# Patient Record
Sex: MALE | Race: BLACK OR AFRICAN AMERICAN | NOT HISPANIC OR LATINO | URBAN - METROPOLITAN AREA
[De-identification: names, ages, dates, MRNs, and addresses within clinical notes are randomized per-mention and may not be internally consistent; named-entity substitution may affect disease eponyms.]

---

## 2023-08-31 ENCOUNTER — OFFICE (OUTPATIENT)
Dept: URBAN - METROPOLITAN AREA CLINIC 76 | Facility: CLINIC | Age: 73
End: 2023-08-31

## 2023-08-31 VITALS
SYSTOLIC BLOOD PRESSURE: 104 MMHG | OXYGEN SATURATION: 98 % | HEIGHT: 69 IN | WEIGHT: 227 LBS | DIASTOLIC BLOOD PRESSURE: 72 MMHG | HEART RATE: 89 BPM

## 2023-08-31 DIAGNOSIS — D64.9 ANEMIA, UNSPECIFIED: ICD-10-CM

## 2023-08-31 DIAGNOSIS — R68.81 EARLY SATIETY: ICD-10-CM

## 2023-08-31 DIAGNOSIS — R14.0 ABDOMINAL DISTENSION (GASEOUS): ICD-10-CM

## 2023-08-31 PROCEDURE — 99204 OFFICE O/P NEW MOD 45 MIN: CPT | Performed by: INTERNAL MEDICINE

## 2023-12-01 ENCOUNTER — OFFICE VISIT (OUTPATIENT)
Dept: ORTHOPEDIC SURGERY | Facility: CLINIC | Age: 73
End: 2023-12-01
Payer: OTHER GOVERNMENT

## 2023-12-01 VITALS — TEMPERATURE: 97.6 F | HEIGHT: 69 IN | WEIGHT: 229.8 LBS | BODY MASS INDEX: 34.04 KG/M2

## 2023-12-01 DIAGNOSIS — G89.29 CHRONIC LEFT SHOULDER PAIN: ICD-10-CM

## 2023-12-01 DIAGNOSIS — R52 PAIN: Primary | ICD-10-CM

## 2023-12-01 DIAGNOSIS — M25.512 CHRONIC LEFT SHOULDER PAIN: ICD-10-CM

## 2023-12-01 PROCEDURE — 99203 OFFICE O/P NEW LOW 30 MIN: CPT | Performed by: ORTHOPAEDIC SURGERY

## 2023-12-01 RX ORDER — LOSARTAN POTASSIUM 25 MG/1
1 TABLET ORAL EVERY 24 HOURS
COMMUNITY

## 2023-12-01 RX ORDER — ASPIRIN 81 MG/1
81 TABLET ORAL DAILY
COMMUNITY

## 2023-12-01 RX ORDER — LANOLIN ALCOHOL/MO/W.PET/CERES
3 CREAM (GRAM) TOPICAL NIGHTLY
COMMUNITY

## 2023-12-01 RX ORDER — FLUTICASONE PROPIONATE 50 MCG
2 SPRAY, SUSPENSION (ML) NASAL DAILY
COMMUNITY

## 2023-12-01 RX ORDER — TAMSULOSIN HYDROCHLORIDE 0.4 MG/1
2 CAPSULE ORAL DAILY
COMMUNITY

## 2023-12-01 RX ORDER — ATORVASTATIN CALCIUM 20 MG/1
20 TABLET, FILM COATED ORAL DAILY
COMMUNITY

## 2023-12-01 RX ORDER — METHYLPREDNISOLONE 4 MG/1
TABLET ORAL
Qty: 21 TABLET | Refills: 0 | Status: SHIPPED | OUTPATIENT
Start: 2023-12-01

## 2023-12-01 RX ORDER — HYDROCHLOROTHIAZIDE 25 MG/1
25 TABLET ORAL DAILY
COMMUNITY

## 2023-12-01 NOTE — PROGRESS NOTES
"Left shoulder pain  Patient: Ángel Ferguson    YOB: 1950    Medical Record Number: [id number]    Chief Complaints: Left shoulder pain    History of Present Illness:     73 y.o. male patient who presents for his left shoulder.  He tells me that he had a left rotator cuff repair in 2003.  He says that at that time his doctor told him he would \"always have problems with that shoulder\".  It took him about a year to recover from the surgery but he says he ultimately did well.  He said his shoulder was doing fine up until about 2 weeks ago.  He simply woke up in the middle of the night with severe pain in the shoulder and he was unable to raise the arm.  He says that over the past couple of weeks the pain has steadily gotten better.  He has been using \"athletic rub\" on the shoulder and he feels like that has helped.  He estimates his improvement at around 70% at this point.  He gestures diffusely around the shoulder when asked to localize his pain.  He says that he does get occasional shooting pain down the arm.  He has also been experiencing intermittent numbness and tingling.    Allergies: No Known Allergies    Home Medications:      Current Outpatient Medications:     apixaban (Eliquis) 5 MG tablet tablet, Take 1 tablet by mouth Every 12 (Twelve) Hours., Disp: , Rfl:     aspirin 81 MG EC tablet, Take 1 tablet by mouth Daily., Disp: , Rfl:     atorvastatin (LIPITOR) 20 MG tablet, Take 1 tablet by mouth Daily., Disp: , Rfl:     Carboxymethylcellulose Sodium (REFRESH TEARS OP), Apply  to eye(s) as directed by provider., Disp: , Rfl:     fluticasone (FLONASE) 50 MCG/ACT nasal spray, 2 sprays into the nostril(s) as directed by provider Daily., Disp: , Rfl:     hydroCHLOROthiazide (HYDRODIURIL) 25 MG tablet, Take 1 tablet by mouth Daily., Disp: , Rfl:     losartan (COZAAR) 25 MG tablet, Take 1 tablet by mouth Daily., Disp: , Rfl:     melatonin 3 MG tablet, Take 1 tablet by mouth Every Night., Disp: , Rfl: " "    tamsulosin (FLOMAX) 0.4 MG capsule 24 hr capsule, Take 2 capsules by mouth Daily., Disp: , Rfl:     Past Medical History:   Diagnosis Date    Arthritis of back     Knee swelling     Lumbosacral disc disease     Periarthritis of shoulder     Rotator cuff syndrome        Past Surgical History:   Procedure Laterality Date    HAND SURGERY      KNEE SURGERY      SHOULDER SURGERY         Social History     Occupational History    Not on file   Tobacco Use    Smoking status: Never    Smokeless tobacco: Not on file   Substance and Sexual Activity    Alcohol use: Not Currently    Drug use: Never    Sexual activity: Not Currently     Partners: Female     Birth control/protection: Condom     Comment: Diagnosed with ED!      Social History     Social History Narrative    Not on file       No family history on file.    Review of Systems:      Constitutional: Denies fever, shaking or chills   Eyes: Denies change in visual acuity   HEENT: Denies nasal congestion or sore throat   Respiratory: Denies cough or shortness of breath   Cardiovascular: Denies chest pain or edema  Endocrine: Denies tremors, palpitations, intolerance of heat or cold, polyuria, polydipsia.  GI: Denies abdominal pain, nausea, vomiting, bloody stools or diarrhea  : Denies frequency, urgency, incontinence, retention, or nocturia.  Musculoskeletal: Denies numbness, tingling or loss of motor function except as above  Integument: Denies rash, lesion or ulceration   Neurologic: Denies headache or focal weakness, deficits  Heme: Denies spontaneous or excessive bleeding, epistaxis, hematuria, melena, fatigue, enlarged or tender lymph nodes.      All other pertinent positives and negatives as noted above in HPI.    Physical Exam: 73 y.o. male    Vitals:    12/01/23 1402   Temp: 97.6 °F (36.4 °C)   Weight: 104 kg (229 lb 12.8 oz)   Height: 175.3 cm (69\")       General:  Patient is awake and alert.  Appears in no acute distress or discomfort.    Psych:  Affect and " demeanor are appropriate.    Eyes:  Conjunctiva and sclera appear grossly normal.  Eyes track well and EOM seem to be intact.    Ears:  No gross abnormalities.  Hearing adequate for the exam.    Cardiovascular:  Regular rate and rhythm.    Lungs:  Good chest expansion.  Breathing unlabored.    Lymph:  No palpable masses or adenopathy in the left upper extremity    Neck: Skin is benign.  No tenderness.  Lateral flexion and rotation are both limited.  Spurling's to the left is very painful for him and reproduces significant shoulder girdle pain.    Extremities: Left shoulder is examined.  Skin is benign.  No effusion or increased warmth.  Mild to moderate anterior tenderness over the rotator interval.  His motion is overall pretty good.  He lacks a few degrees of horizontal abduction and roughly 2-3 levels of internal rotation.  No firm endpoint in any plane of motion.  He has markedly positive Neer and Dunlap impingement maneuvers.  Interestingly, he has good strength in his rotator cuff and deltoid with relatively little discomfort.  Intact motor and sensory function in his lower arm and hand.  Brisk capillary refill.         Radiology:   AP, scapular Y, and axillary views of the left shoulder are ordered by myself and reviewed to evaluate the patient's complaint.  No comparison films are immediately available.  The x-rays show no obvious acute abnormalities, lesions, masses, significant degenerative changes, or other concerning findings.  The acromiohumeral interval is normal.  Glenoid version appears normal as well.     Assessment/Plan: Left shoulder and arm pain, suspected cervical etiology    I think he has both neck and intrinsic shoulder pathology.  My impression is that the neck may be causing more of his pain.  I offered him a subacromial injection for the shoulder.  I think that would be helpful from both a diagnostic and therapeutic standpoint just to see how much of his pain is intrinsic to the shoulder  as opposed to extrinsic sources.  He is apprehensive about injections and was not interested in that option.  I suggested that a Medrol Dosepak might help.  Whether it is the neck or the shoulder, I would expect the steroids to make him feel better.  He is already 70% better and maybe if we try Dosepak we could get him the rest of the way there.  He is potentially open to that idea but he is concerned about his kidneys and liver.  He wants to talk with his nephrologist before taking any new medicines.  I think that is a good idea as well.  I went ahead and gave him the prescription for the Medrol Dosepak.  He is going to clear with his nephrologist.  I told him to try that and then call me if no better.  He was open to that idea.    Mikel Segovia MD    12/01/2023    CC to Provider, No Known